# Patient Record
Sex: FEMALE | Race: WHITE | NOT HISPANIC OR LATINO | Employment: FULL TIME | URBAN - METROPOLITAN AREA
[De-identification: names, ages, dates, MRNs, and addresses within clinical notes are randomized per-mention and may not be internally consistent; named-entity substitution may affect disease eponyms.]

---

## 2022-05-19 ENCOUNTER — OFFICE VISIT (OUTPATIENT)
Dept: URGENT CARE | Facility: CLINIC | Age: 32
End: 2022-05-19
Payer: COMMERCIAL

## 2022-05-19 ENCOUNTER — HOSPITAL ENCOUNTER (OUTPATIENT)
Facility: MEDICAL CENTER | Age: 32
End: 2022-05-19
Attending: PHYSICIAN ASSISTANT
Payer: COMMERCIAL

## 2022-05-19 VITALS
RESPIRATION RATE: 16 BRPM | TEMPERATURE: 98.2 F | OXYGEN SATURATION: 97 % | HEIGHT: 62 IN | WEIGHT: 128 LBS | DIASTOLIC BLOOD PRESSURE: 78 MMHG | HEART RATE: 91 BPM | SYSTOLIC BLOOD PRESSURE: 122 MMHG | BODY MASS INDEX: 23.55 KG/M2

## 2022-05-19 DIAGNOSIS — N12 PYELONEPHRITIS: ICD-10-CM

## 2022-05-19 LAB
APPEARANCE UR: CLEAR
BILIRUB UR STRIP-MCNC: NEGATIVE MG/DL
COLOR UR AUTO: YELLOW
GLUCOSE UR STRIP.AUTO-MCNC: NEGATIVE MG/DL
INT CON NEG: NORMAL
INT CON POS: NORMAL
KETONES UR STRIP.AUTO-MCNC: NEGATIVE MG/DL
LEUKOCYTE ESTERASE UR QL STRIP.AUTO: NORMAL
NITRITE UR QL STRIP.AUTO: POSITIVE
PH UR STRIP.AUTO: 6 [PH] (ref 5–8)
POC URINE PREGNANCY TEST: NEGATIVE
PROT UR QL STRIP: NEGATIVE MG/DL
RBC UR QL AUTO: NEGATIVE
SP GR UR STRIP.AUTO: 1.01
UROBILINOGEN UR STRIP-MCNC: 1 MG/DL

## 2022-05-19 PROCEDURE — 87186 SC STD MICRODIL/AGAR DIL: CPT

## 2022-05-19 PROCEDURE — 87077 CULTURE AEROBIC IDENTIFY: CPT

## 2022-05-19 PROCEDURE — 99203 OFFICE O/P NEW LOW 30 MIN: CPT | Performed by: PHYSICIAN ASSISTANT

## 2022-05-19 PROCEDURE — 87086 URINE CULTURE/COLONY COUNT: CPT

## 2022-05-19 PROCEDURE — 81025 URINE PREGNANCY TEST: CPT | Performed by: PHYSICIAN ASSISTANT

## 2022-05-19 PROCEDURE — 81002 URINALYSIS NONAUTO W/O SCOPE: CPT | Performed by: PHYSICIAN ASSISTANT

## 2022-05-19 RX ORDER — SULFAMETHOXAZOLE AND TRIMETHOPRIM 800; 160 MG/1; MG/1
1 TABLET ORAL EVERY 12 HOURS
Qty: 28 TABLET | Refills: 0 | Status: SHIPPED | OUTPATIENT
Start: 2022-05-19 | End: 2022-06-02

## 2022-05-19 RX ORDER — ONDANSETRON 4 MG/1
4 TABLET, FILM COATED ORAL EVERY 6 HOURS PRN
Qty: 20 TABLET | Refills: 1 | Status: SHIPPED | OUTPATIENT
Start: 2022-05-19

## 2022-05-19 ASSESSMENT — ENCOUNTER SYMPTOMS
NAUSEA: 1
VOMITING: 0
FEVER: 1
CHILLS: 1
FLANK PAIN: 1
DIARRHEA: 0
ABDOMINAL PAIN: 0

## 2022-05-19 NOTE — PROGRESS NOTES
Subjective:     Talita Vigil  is a 31 y.o. female who presents for Side Pain (On the right side /Started on 05/17/2022/Had fever yesterday 101F /Temp this morning was 100 used Motrin. Pt side pain is getting worse  )      HPI    Patient presents urgent care noting right flank pain times last 2 to 3 days.  She notes fever onset the day before yesterday waxing waning over the last 48 hours with a max temp of 101.  She has used Motrin for fever with some improvement.  She complains of a pain that radiates around to abdomen from right flank.  She notes some urgency of urination and incomplete voiding.  She denies dysuria or hematuria.  She does have mild dysuria.  She denies history of UTI.  Denies history of pyelonephritis or complicated UTI.  Last menstrual period was normal for her in 2 weeks ago.  She denies abnormal vaginal discharge.  Denies concern for STI.  Has treated with OTC fever reducers.  She denies a history of kidney stones.  She complains of nausea without vomiting.  She notes mild abdominal tightness.    Review of Systems   Constitutional: Positive for chills and fever.   Gastrointestinal: Positive for nausea. Negative for abdominal pain, diarrhea and vomiting.   Genitourinary: Positive for dysuria, flank pain and frequency. Negative for hematuria and urgency.   Skin: Negative for rash.       Medications:    • This patient does not have an active medication from one of the medication groupers.    Allergies: Patient has no allergy information on record.    Problem List: Talita Vigil does not have a problem list on file.    Surgical History:  No past surgical history on file.    Past Social Hx: Talita Vigil  reports that she has been smoking. She has never used smokeless tobacco. She reports current drug use. Drug: Marijuana. She reports that she does not drink alcohol.     Past Family Hx:  Talita Vigil family history is not on file.     Problem list, medications, and allergies reviewed by  "myself today in Epic.     Objective:   /78 (BP Location: Right arm, Patient Position: Sitting, BP Cuff Size: Adult)   Pulse 91   Temp 36.8 °C (98.2 °F) (Temporal)   Resp 16   Ht 1.575 m (5' 2\")   Wt 58.1 kg (128 lb)   SpO2 97%   BMI 23.41 kg/m²     Physical Exam  Vitals and nursing note reviewed.   Constitutional:       General: She is not in acute distress.     Appearance: Normal appearance. She is well-developed. She is not diaphoretic.   HENT:      Head: Normocephalic and atraumatic.      Right Ear: External ear normal.      Left Ear: External ear normal.      Nose: Nose normal.   Eyes:      General: Lids are normal. No scleral icterus.        Right eye: No discharge.         Left eye: No discharge.      Conjunctiva/sclera: Conjunctivae normal.   Pulmonary:      Effort: Pulmonary effort is normal. No respiratory distress.   Abdominal:      Palpations: Abdomen is soft. Abdomen is not rigid.      Tenderness: There is abdominal tenderness ( very mild suprapubic) in the suprapubic area. There is right CVA tenderness. There is no left CVA tenderness, guarding or rebound. Negative signs include Hall's sign.   Musculoskeletal:         General: Normal range of motion.      Cervical back: Neck supple.   Skin:     General: Skin is warm and dry.      Coloration: Skin is not pale.      Findings: No erythema.   Neurological:      Mental Status: She is alert and oriented to person, place, and time. She is not disoriented.   Psychiatric:         Speech: Speech normal.         Behavior: Behavior normal.     Rocephin 500 mg IM-tolerates well  Patient declined Zofran in clinic    Point-of-care urinalysis  Point-of-care hCG-negative    Assessment/Plan:   Assessment      1. Pyelonephritis  - POCT Urinalysis  - POCT Pregnancy  - cefTRIAXone (ROCEPHIN) 500 mg, lidocaine (XYLOCAINE) 1 % 1.8 mL for IM use  - sulfamethoxazole-trimethoprim (BACTRIM DS) 800-160 MG tablet; Take 1 Tablet by mouth every 12 hours for 14 days.  " Dispense: 28 Tablet; Refill: 0  - ondansetron (ZOFRAN) 4 MG Tab tablet; Take 1 Tablet by mouth every 6 hours as needed for Nausea/Vomiting.  Dispense: 20 Tablet; Refill: 1  - URINE CULTURE(NEW); Future  Supportive care is reviewed with patient/caregiver - recommend to push PO fluids and electrolytes, nsaids/tylenol, rest, full course of abx, probiotics w/ abx, azo/cran, observe for resolution  Return to clinic with lack of resolution or progression of symptoms.  Will call if change of abx is indicated by urine cx  ER precautions with any worsening symptoms are reviewed with patient/caregiver and they do express understanding    I have worn an N95 mask, gloves and eye protection for the entire encounter with this patient.     Differential diagnosis, natural history, supportive care, and indications for immediate follow-up discussed.

## 2022-05-19 NOTE — LETTER
May 19, 2022       Patient: Talita Vigil   YOB: 1990   Date of Visit: 5/19/2022         To Whom It May Concern:    In my medical opinion, I recommend that Talita Vigil should be excused from work for today and tomorrow, 5/19 and 5/20      If you have any questions or concerns, please don't hesitate to call [phone]          Sincerely,          Douglas Johansen P.A.-C.  Electronically Signed

## 2022-05-22 LAB
BACTERIA UR CULT: ABNORMAL
BACTERIA UR CULT: ABNORMAL
SIGNIFICANT IND 70042: ABNORMAL
SITE SITE: ABNORMAL
SOURCE SOURCE: ABNORMAL